# Patient Record
Sex: MALE | Race: WHITE | NOT HISPANIC OR LATINO | URBAN - METROPOLITAN AREA
[De-identification: names, ages, dates, MRNs, and addresses within clinical notes are randomized per-mention and may not be internally consistent; named-entity substitution may affect disease eponyms.]

---

## 2023-10-09 ENCOUNTER — OFFICE VISIT (OUTPATIENT)
Dept: DENTISTRY | Facility: CLINIC | Age: 38
End: 2023-10-09

## 2023-10-09 VITALS — HEART RATE: 93 BPM | SYSTOLIC BLOOD PRESSURE: 119 MMHG | DIASTOLIC BLOOD PRESSURE: 80 MMHG

## 2023-10-09 DIAGNOSIS — Z01.20 ENCOUNTER FOR DENTAL EXAMINATION: Primary | ICD-10-CM

## 2023-10-09 PROCEDURE — D0270 BITEWING - SINGLE RADIOGRAPHIC IMAGE: HCPCS

## 2023-10-09 PROCEDURE — D0220 INTRAORAL - PERIAPICAL FIRST RADIOGRAPHIC IMAGE: HCPCS

## 2023-10-09 PROCEDURE — D0140 LIMITED ORAL EVALUATION - PROBLEM FOCUSED: HCPCS

## 2023-10-09 NOTE — DENTAL PROCEDURE DETAILS
Italia Correia 45 y.o male presents for emergency dental visit. CC: My filling on the top right came out and it's been sensitive    PMH reviewed, no changes, ASA I. Subjective history: Onset: about 3-4 weeks ago  Provocation: cold and chewing  Location: Upper right, points to #4. Timing: Lingering sensitivity. Was at its worst about 2 weeks ago, has since gotten a little better. Objective findings:  #4 DO broken filling and clinical caries. MO existing large composite  PA and BW radiograph #4: Large DO decay in close proximity to to pulp chamber, large existing MO composite with recurrent decay also in close proximity to pulp chamber. No PARL. Percussion testing 3, 4, 5  #3 percussion (-)  #4 percussion (+) mild response  #5 percussion (-)  Cold testing 3, 4, 5  #3 normal response  #4 lingering response  #5 normal response    Diagnosis tooth #4: Symptomatic irreversible pulpitis, Symptomatic apical periodontitis. Restorative prognosis: Favorable with full coverage crown. Radiographic margin of caries is ~3 mm coronal to alveolar crest.    Informed patient that RCT, post/core and crown is indicated to save tooth #4, and the alternative is extraction. Informed that we may proceed with RCT and core buildup as soon as possible to preserve tooth #4, after which we may bring him for comprehensive exam and treatment planning of the final crown and/or other restorative needs. Patient understands and consents.     NV: RCT #4

## 2023-11-08 ENCOUNTER — OFFICE VISIT (OUTPATIENT)
Dept: DENTISTRY | Facility: CLINIC | Age: 38
End: 2023-11-08

## 2023-11-08 DIAGNOSIS — K04.5 SYMPTOMATIC PERIAPICAL PERIODONTITIS: Primary | ICD-10-CM

## 2023-11-08 DIAGNOSIS — K04.02 SYMPTOMATIC IRREVERSIBLE PULPITIS: ICD-10-CM

## 2023-11-08 PROCEDURE — WIS3000 RC ACCESS

## 2023-11-09 NOTE — DENTAL PROCEDURE DETAILS
RCT #4 - Stage 1    Patient presents for stage I endo #4. PMH reviewed, no changes. ASA I. Patient reports no medical conditions and NKFDA. Pain: 2/10 (#4)    #4 abnormal to percussion and cold test, normal to palpation  #4 diagnoses: symptomatic apical periodontitis, symptomatic irreversible pulpitis    Radiographs are current. Reminded pt that #4 is planned for RCT, post/core build up, and crown. Alternative is extraction. Discussed risks/benefits of treatment vs. No treatment. Patient elects to complete RCT #4 option. Explained that we will begin RCT today, and patient will have to return for additional appt. Also explained restorative process. Patient understands and consents. Applied topical benzocaine, administered carps 0.85 mL (0.5 carps) 2% lido 1:100k epi via right greater palatine block after negative aspiration and 1.7 mL (1 carp) 4% articaine 1:100k epi via maxillary infiltration at root apex #4 after negative aspiration. Adequate anesthesia achieved. Clamp and rubber dam placed. Caries removed and pulp chamber accessed with round carbide. Early coronal flare with GGB #1-4. Working length determined with apex locater, hand filed to 20 k file with sodium hypochlorite irrigation. Dried canal with paper points. Obtained working length of 21 mm from lingual cusp. Placed CaOH2, cotton pellet, and restored with Cavit. Occlusion acceptable. Maintained patient comfort for entire visit. ?  Single canal: 21 mm from lingual cusp (confirmed with apex )    Complications: none    All questions and concerns addressed. Patient expressed interest in continuing care at [facility]. Recommended patient discuss with  to see if we can accommodate. Patient left comfortably and ambulatory.      Attending: Dr. Yasmin Lockwood    NV: rotary file and obturation #4  NVV: comprehensive exam

## 2023-11-24 ENCOUNTER — OFFICE VISIT (OUTPATIENT)
Dept: DENTISTRY | Facility: CLINIC | Age: 38
End: 2023-11-24

## 2023-11-24 VITALS — SYSTOLIC BLOOD PRESSURE: 121 MMHG | HEART RATE: 78 BPM | TEMPERATURE: 97.8 F | DIASTOLIC BLOOD PRESSURE: 81 MMHG

## 2023-11-24 DIAGNOSIS — K04.02 IRREVERSIBLE PULPITIS: Primary | ICD-10-CM

## 2023-11-24 PROCEDURE — D3320 ENDODONTIC THERAPY, PREMOLAR TOOTH (EXCLUDING FINAL RESTORATION): HCPCS

## 2023-11-27 NOTE — DENTAL PROCEDURE DETAILS
Lisandro Madison. Richard Gutierrez presents to [facility] dental [facility] for completion of #4 RCT. H&P reviewed with no changes. Pain 0/10, ASA class II. Patient was previously seen for emergency visit at this clinic at which time first step of endodontic treatment on tooth #4 was completed. Tooth was accessed, working length obtained, and calcium hydroxide placed at that visit. Local anesthesia: 1/2 cartridge 4% articaine w/ 1:100,000 epinephrine via local infiltration. Rubber dam isolation achieved. Removed temp filling and cotton pellet, cleaned access. Irrigated with sodium hypochlorite to remove calcium hydroxide. Confirmed WL of single canal radiographically 21mm using #15 K @ 21mm. Instrumented with i[employer] reciprocating file system to size medium (green) file to a final WL of 21mm. Master cone radiograph taken. Canal obturated with GuttaCore single cone with BC sealer. Post-op radiograph taken showing a small amount of extrusion past the apex. Patient advised that he may experience some post-op sensitivity but that it should subside and to contact our office if it persists. He understood. Placed cotton pellet with Cavit temporary filling. Attending Dr. Elda Santana supervised this treatment and was consulted during treatment.      NV: Comp exam  NNV: #4 post and core

## 2023-11-30 ENCOUNTER — OFFICE VISIT (OUTPATIENT)
Dept: DENTISTRY | Facility: CLINIC | Age: 38
End: 2023-11-30

## 2023-11-30 VITALS — DIASTOLIC BLOOD PRESSURE: 79 MMHG | SYSTOLIC BLOOD PRESSURE: 115 MMHG | HEART RATE: 76 BPM

## 2023-11-30 DIAGNOSIS — Z01.21 ENCOUNTER FOR DENTAL EXAMINATION AND CLEANING WITH ABNORMAL FINDINGS: Primary | ICD-10-CM

## 2023-11-30 PROCEDURE — D0210 INTRAORAL - COMPLETE SERIES OF RADIOGRAPHIC IMAGES: HCPCS | Performed by: DENTIST

## 2023-11-30 PROCEDURE — D0150 COMPREHENSIVE ORAL EVALUATION - NEW OR ESTABLISHED PATIENT: HCPCS | Performed by: DENTIST

## 2023-11-30 NOTE — PROGRESS NOTES
Comprehensive Exam    Tameka Darden presents for a comprehensive exam. Verbal consent for treatment given in addition to the forms. Reviewed health history - Patient is ASA I  Consents signed: Yes    Perio: Pt has mild plaque accumulation generally, mild gingiivitis generally  Radiog: Mild horizontal bone loss in reln to Ur, LL, LR  Visible decay #18(MOB), rct completed #4   Pain Scale: None   Caries Assessment: High     Radiographs: FMX taken    Oral Hygiene instruction reviewed and given. Hygiene recall visits recommended to the patient. Treatment Plan:  Infection control  Periodontal therapy: Anuel  Caries control: #18(MOB) comp. (Deep)  Occlusal evaluation: Cl 1 Molar relation, bilateral, U/L CROWDING    Prognosis is Fair  Referrals needed: OS referral in the future for consult for extrn partially submerged #17,32 and #1  Pt. V concerned about pending tr and finances, requested fr. Desk to give pt. Info.     Next visit: Comp fill #18(MOB) followed by Camilo daly:  p/c , prep #4

## 2023-12-07 ENCOUNTER — OFFICE VISIT (OUTPATIENT)
Dept: DENTISTRY | Facility: CLINIC | Age: 38
End: 2023-12-07

## 2023-12-07 VITALS — DIASTOLIC BLOOD PRESSURE: 83 MMHG | HEART RATE: 90 BPM | SYSTOLIC BLOOD PRESSURE: 127 MMHG

## 2023-12-07 DIAGNOSIS — K02.9 SECONDARY DENTAL CARIES ASSOCIATED WITH FAILED OR DEFECTIVE DENTAL RESTORATION: Primary | ICD-10-CM

## 2023-12-07 DIAGNOSIS — K08.50 SECONDARY DENTAL CARIES ASSOCIATED WITH FAILED OR DEFECTIVE DENTAL RESTORATION: Primary | ICD-10-CM

## 2023-12-07 PROCEDURE — D2392 RESIN-BASED COMPOSITE - 2 SURFACES, POSTERIOR: HCPCS | Performed by: DENTIST

## 2023-12-07 NOTE — DENTAL PROCEDURE DETAILS
Composite Filling #18 MO    Kasi Kirsten presents for composite filling. Patient consent treatment. PMH reviewed, no changes. ASA: I  Pain score: 0  Chief complain: "I need to replace a filling". DX: tooth #18 existing defective MO large and deep composite filling with recurrent extensive caries approaching the pulp and extending subgingival. (Made patient aware before start about possible need of RCT if tooth becomes symptomatic and patient agreed). Discussed with patient need for RCT if pulp exposure occurs or in future if pulp is inflamed. Pt understands and consents. Treatment plan: was provided by Dr. Reginald Sepulveda last visit. Applied topical benzocaine, administered one carps 1.7 ML of2% lido 1:100k epi via MARRY block and and half one carps of 1.7 ML 4% articaine 1:100k epi via mandibular infiltration. Prepped tooth #18 MO with 245 gilbert on high speed. Caries removed with round carbide on slow speed. Placed Du matrix. Isolation with Dry Shield, cotton rolls and dri-angles  Etch with 37% H2PO4, rinse, dry. Applied Adhese with 20 second scrub once, gentle air dry and light cured for 10s. Restored with Tetric flowable and bulk tanja shade A3 and light cured. Refined with finishing burs, polished with enhance point. Verified occlusion and contacts. POI is given. Pt left satisfied and ambulatory. NV: Continue restorative care.